# Patient Record
Sex: FEMALE | Race: BLACK OR AFRICAN AMERICAN | NOT HISPANIC OR LATINO | Employment: UNEMPLOYED | URBAN - METROPOLITAN AREA
[De-identification: names, ages, dates, MRNs, and addresses within clinical notes are randomized per-mention and may not be internally consistent; named-entity substitution may affect disease eponyms.]

---

## 2023-08-08 PROBLEM — F43.10 PTSD (POST-TRAUMATIC STRESS DISORDER): Status: ACTIVE | Noted: 2023-08-08

## 2023-08-08 PROBLEM — F33.1 MDD (MAJOR DEPRESSIVE DISORDER), RECURRENT EPISODE, MODERATE: Status: ACTIVE | Noted: 2023-08-08

## 2023-08-11 ENCOUNTER — SOCIAL WORK (OUTPATIENT)
Dept: ADMINISTRATIVE | Facility: OTHER | Age: 33
End: 2023-08-11

## 2023-08-11 NOTE — PROGRESS NOTES
Sw received a consult to assist with counseling. Matias called and spoke to Patient ([phone]). She is agreeable to try counseling. Matias faxed a referral to [facility] to review.    Enedina Loaiza LCSW    [phone]

## 2023-08-14 ENCOUNTER — SOCIAL WORK (OUTPATIENT)
Dept: ADMINISTRATIVE | Facility: OTHER | Age: 33
End: 2023-08-14

## 2023-08-14 NOTE — PROGRESS NOTES
Sw called [facility] to follow-up on the counseling referral. Staff received the consult and attempted to reach Patient by phone but was unable to reach her. Once Patient is scheduled, Sw will be notified. Matias verbalized appreciation.    [facility]  [address]  [address]  [place], [place]  [phone]    Enedina Loaiza LCSW    [phone]

## 2023-12-18 ENCOUNTER — SOCIAL WORK (OUTPATIENT)
Dept: ADMINISTRATIVE | Facility: OTHER | Age: 33
End: 2023-12-18

## 2023-12-18 PROBLEM — Z34.91 SUPERVISION OF LOW-RISK PREGNANCY, FIRST TRIMESTER: Status: ACTIVE | Noted: 2023-12-18

## 2023-12-18 PROBLEM — O99.210 OBESITY IN PREGNANCY: Status: ACTIVE | Noted: 2023-12-18

## 2023-12-18 NOTE — PROGRESS NOTES
SW completed pt's notification of pregnancy and faxed/scanned into epic. No other needs identified at this time.    LATANYA Almaguer  Desk:[phone]  Fax:[phone]

## 2023-12-20 PROBLEM — A59.9 TRICHOMONAS INFECTION: Status: ACTIVE | Noted: 2023-12-20

## 2024-03-04 PROBLEM — Z34.92 ENCOUNTER FOR SUPERVISION OF LOW-RISK PREGNANCY IN SECOND TRIMESTER: Status: ACTIVE | Noted: 2024-03-04

## 2024-04-12 ENCOUNTER — PATIENT MESSAGE (OUTPATIENT)
Dept: ADMINISTRATIVE | Facility: OTHER | Age: 34
End: 2024-04-12

## 2024-04-22 PROBLEM — O09.32 LIMITED PRENATAL CARE IN SECOND TRIMESTER: Status: ACTIVE | Noted: 2024-04-22

## 2024-04-22 PROBLEM — R10.2 PAIN OF ROUND LIGAMENT AFFECTING PREGNANCY, ANTEPARTUM: Status: ACTIVE | Noted: 2024-04-22

## 2024-04-22 PROBLEM — O26.899 PAIN OF ROUND LIGAMENT AFFECTING PREGNANCY, ANTEPARTUM: Status: ACTIVE | Noted: 2024-04-22
